# Patient Record
Sex: MALE | Race: WHITE | NOT HISPANIC OR LATINO | Employment: FULL TIME | URBAN - METROPOLITAN AREA
[De-identification: names, ages, dates, MRNs, and addresses within clinical notes are randomized per-mention and may not be internally consistent; named-entity substitution may affect disease eponyms.]

---

## 2017-01-04 ENCOUNTER — INITIAL CONSULT (OUTPATIENT)
Dept: OTOLARYNGOLOGY | Facility: CLINIC | Age: 37
End: 2017-01-04
Payer: COMMERCIAL

## 2017-01-04 ENCOUNTER — CLINICAL SUPPORT (OUTPATIENT)
Dept: AUDIOLOGY | Facility: CLINIC | Age: 37
End: 2017-01-04
Payer: COMMERCIAL

## 2017-01-04 DIAGNOSIS — R42 DIZZINESS: Primary | ICD-10-CM

## 2017-01-04 DIAGNOSIS — H90.3 HEARING LOSS, SENSORINEURAL, ASYMMETRICAL: Primary | ICD-10-CM

## 2017-01-04 PROCEDURE — 1159F MED LIST DOCD IN RCRD: CPT | Mod: S$GLB,,, | Performed by: ORTHOPAEDIC SURGERY

## 2017-01-04 PROCEDURE — 92567 TYMPANOMETRY: CPT | Mod: 51,S$GLB,, | Performed by: AUDIOLOGIST

## 2017-01-04 PROCEDURE — 92557 COMPREHENSIVE HEARING TEST: CPT | Mod: S$GLB,,, | Performed by: AUDIOLOGIST

## 2017-01-04 PROCEDURE — 99204 OFFICE O/P NEW MOD 45 MIN: CPT | Mod: S$GLB,,, | Performed by: ORTHOPAEDIC SURGERY

## 2017-01-04 NOTE — PROGRESS NOTES
"Subjective:       Patient ID: Miko Morton is a 36 y.o. male.    Chief Complaint: No chief complaint on file.    HPI Comments: Patient is a very pleasant 36 year old gentleman here to see me today for the first time for evaluation of dizziness.  He says that he first started with abnormal shooting pain in August, and several months after that began to have issues with dizziness.  He says that the dizziness is a feeling of things "dragging" when he turns his head.  He says that is there nearly all the time, and improves as the day progresses.  He has recently started taking Lexapro, and thinks that may be increasing his symptoms.  He has no complaints of ear pain, ear pressure, or persistent tinnitus.  He has not noted any hearing loss, and has had an audiogram earlier today (his first since he was in school).  He has had an MRI that is consistent with possible MS, but his CSF examination was normal.  He has an upcoming appointment with a MS clinic in [place].  He is also due to see Rheumatology.    Review of Systems   Constitutional: Positive for fatigue. Negative for fever and unexpected weight change.   HENT: Positive for tinnitus (very intermittent). Negative for congestion, ear discharge, ear pain, facial swelling, hearing loss, nosebleeds, postnasal drip, rhinorrhea, sinus pressure, sneezing, sore throat, trouble swallowing and voice change.    Eyes: Negative for discharge, redness and itching.   Respiratory: Negative for cough, choking, shortness of breath and wheezing.    Cardiovascular: Negative for chest pain and palpitations.   Gastrointestinal: Negative for abdominal pain.        No reflux.   Musculoskeletal: Positive for arthralgias, back pain (pressure over his back) and myalgias. Negative for neck pain.   Neurological: Positive for dizziness and light-headedness. Negative for facial asymmetry and headaches.   Hematological: Negative for adenopathy. Does not bruise/bleed easily. "   Psychiatric/Behavioral: Negative for agitation, behavioral problems, confusion and decreased concentration.       Objective:      Physical Exam   Constitutional: He is oriented to person, place, and time. Vital signs are normal. He appears well-developed and well-nourished. No distress.   HENT:   Head: Normocephalic and atraumatic.   Right Ear: Hearing, tympanic membrane, external ear and ear canal normal.   Left Ear: Hearing, tympanic membrane, external ear and ear canal normal.   Nose: Nose normal. No mucosal edema, rhinorrhea, nasal deformity or septal deviation.   Mouth/Throat: Uvula is midline, oropharynx is clear and moist and mucous membranes are normal. No trismus in the jaw. Normal dentition. No uvula swelling. No oropharyngeal exudate or posterior oropharyngeal edema.   Eyes: Conjunctivae and EOM are normal. Pupils are equal, round, and reactive to light. Right eye exhibits no chemosis. Left eye exhibits no chemosis. Right conjunctiva is not injected. Left conjunctiva is not injected. No scleral icterus.   Neck: Trachea normal and phonation normal. No tracheal tenderness present. No tracheal deviation present. No thyroid mass and no thyromegaly present.   Cardiovascular: Intact distal pulses.    Pulmonary/Chest: Effort normal. No accessory muscle usage or stridor. No respiratory distress.   Lymphadenopathy:        Head (right side): No submental, no submandibular, no preauricular and no posterior auricular adenopathy present.        Head (left side): No submental, no submandibular, no preauricular and no posterior auricular adenopathy present.     He has no cervical adenopathy.        Right cervical: No superficial cervical and no deep cervical adenopathy present.       Left cervical: No superficial cervical and no deep cervical adenopathy present.   Neurological: He is alert and oriented to person, place, and time. No cranial nerve deficit.   Skin: Skin is warm and dry. No rash noted. No erythema.    Psychiatric: He has a normal mood and affect. His behavior is normal. Thought content normal.       AUDIOGRAM:  Results reveal a normal   hearing 250-8000 Hz for the right ear, and mild sensorineural hearing loss 7106-9343 Hz for the left ear. Speech Reception Thresholds were 10 dBHL for the right ear and 15 dBHL for the left ear. Word recognition scores were excellent for the right ear and excellent for the left ear. Tympanograms were Type A, normal for the right ear and Type A, normal for the left ear.      MRI BRAIN:  He has had an MRI of the brain with contrast.  Images and report reviewed, there are adequate images of his IAC and there is no abnormality of the IAC to cause his asymmetric hearing loss      Assessment:       1. Dizziness    2. Asymmetrical left sensorineural hearing loss        Plan:       1.  Dizziness:  I had a long discussion with the patient regarding their symptoms.  Dizziness, vertigo and disequilibrium are common symptoms reported by adults during visits to their doctors. They are all symptoms that can result from a peripheral vestibular disorder (a dysfunction of the balance organs of the inner ear) or central vestibular disorder (a dysfunction of one or more parts of the central nervous system that help process balance and spatial information).  There are also non-vestibular causes of dizziness, and dizziness can be linked to a wide array of problems such as blood-flow irregularities from cardiovascular problems and blood pressure fluctuations.  I would recommend a VNG with audiogram for further diagnostic testing, and will contact the patient with further recommendations when that is complete.  2. Asymmetrical left SNHL:  Has had normal MRI of the brain, no further testing is needed at this time.  Annual audiograms, and ear protection around loud noises.  3.  Abnormal MRI:  He is currently in the midst of a neurological workup for an as of yet not fully defined condition.  Has  appointment upcoming in [place] with MS specialist.  He has also been recommended to see Rheumatology, will try and help coordinate rheumatology appointment in the near future.

## 2017-01-04 NOTE — MR AVS SNAPSHOT
[facility] - ENT  [address] [facility] [address] [place] [address]  Phone: [phone]  Fax: [phone]                  Miko Morton   2017 12:45 PM   Initial consult    Description:  Male : 1980   Provider:  Faustina Delgado MD   Department:  [facility] - ENT                To Do List           Future Appointments        Provider Department Dept Phone    2017 11:20 AM Neil Moeller MD [facility] - Internal Medicine [phone]    2017 9:00 AM Juliane Akers, CCC-A [facility] - Audiology [phone]    2017 7:40 AM Foreign Medeiros MD [facility] Neurology [phone]      Goals (5 Years of Data)     None      [facility] On Call     [facility] On Call Nurse [facility] -  Assistance  Registered nurses in the [facility] On Call Center provide clinical advisement, health education, appointment booking, and other advisory services.  Call for this free service at [phone].             Medications           Message regarding Medications     Verify the changes and/or additions to your medication regime listed below are the same as discussed with your clinician today.  If any of these changes or additions are incorrect, please notify your healthcare provider.             Verify that the below list of medications is an accurate representation of the medications you are currently taking.  If none reported, the list may be blank. If incorrect, please contact your healthcare provider. Carry this list with you in case of emergency.           Current Medications     escitalopram oxalate (LEXAPRO) 20 MG tablet Take 1 tablet (20 mg total) by mouth once daily.    hydrOXYzine HCl (ATARAX) 25 MG tablet 1-2 tabs every 8 hours prn anxiety    meloxicam (MOBIC) 15 MG tablet Take 1 tablet (15 mg total) by mouth once daily.    vardenafil (LEVITRA) 20 MG tablet Take 1 tablet (20 mg total) by mouth as needed for Erectile Dysfunction.           Clinical Reference Information           Allergies as of 2017      Sulfa (Sulfonamide Antibiotics)      Immunizations Administered on Date of Encounter - 1/4/2017     None

## 2017-01-04 NOTE — Clinical Note
Is there any way someone in the Rheumatology department could see this nice man sometime in the next few weeks?  He is a , and is having very unusual symptoms (pain, arthralgias, etc.).  He is seeing Neurology, MRI had some abnormal findings but CSF did not support MS.  He is scheduled to see MS specialist at [facility] in [place], but has been recommended to see Rheumatology.  The next available is March, is there any way someone could see him sooner?  Thanks.

## 2017-01-04 NOTE — LETTER
January 5, 2017      Neil Moeller MD  [address] [place] [address]           [facility] - [facility]  [address] [place] [address]  Phone: [phone]  Fax: [phone]          Patient: Miko Morton   MR Number: [id number]   YOB: 1980   Date of Visit: 1/4/2017       Dear Dr. Neil Moleler:    Thank you for referring Miko Morton to me for evaluation. Attached you will find relevant portions of my assessment and plan of care.    If you have questions, please do not hesitate to call me. I look forward to following Miko Morton along with you.    Sincerely,    Faustina Delgado MD    Enclosure  CC:  No Recipients    If you would like to receive this communication electronically, please contact [email or web address] or [phone] to request more information on [facility] access.    For providers and/or their staff who would like to refer a patient to [facility], please contact us through our one-stop-shop provider referral line, [facility] , at [phone].    If you feel you have received this communication in error or would no longer like to receive these types of communications, please e-mail [email or web address]

## 2017-01-04 NOTE — PROGRESS NOTES
Miko Morton was seen 01/04/2017 for an audiological evaluation.  Patient complains of daily dizziness for the past 4-5 months.  He reports that it gradually improves throughout the day.  He also reports if he looks back and forth, like while shopping, the room will spin.  He has experienced occasional tinnitus.  He has no complaint of hearing loss or changes in his hearing since onset of symptoms.  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    Results reveal a normal   hearing 250-8000 Hz for the right ear, and  mild sensorineural hearing loss 7082-1699 Hz for the left ear.   Speech Reception Thresholds were  10 dBHL for the right ear and 15 dBHL for the left ear.   Word recognition scores were excellent for the right ear and excellent for the left ear.   Tympanograms were Type A, normal for the right ear and Type A, normal for the left ear.    Patient was counseled on the above findings.    Recommendations include:    1.  ENT follow-up re:asymmetry  2.  Recheck per ENT, HAs were discussed briefly (he perceives normal hearing)  3.  Wear hearing protective devices around loud noise  4.  Annual audiograms

## 2017-01-06 ENCOUNTER — TELEPHONE (OUTPATIENT)
Dept: OTOLARYNGOLOGY | Facility: CLINIC | Age: 37
End: 2017-01-06

## 2017-01-06 ENCOUNTER — TELEPHONE (OUTPATIENT)
Dept: RHEUMATOLOGY | Facility: CLINIC | Age: 37
End: 2017-01-06

## 2017-01-06 NOTE — TELEPHONE ENCOUNTER
----- Message from Prashant Owens MD sent at 1/6/2017 10:19 AM CST -----  Sure Faustina, We will be able to see him in the next couple of weeks . I will request our team to have in acute waiting list to be called in once any cancellation opens up with any of our providers, which is very common at this time of the year.   Regards  Gloria.   ----- Message -----     From: Faustina Delgado MD     Sent: 1/5/2017  11:22 AM       To: Prashant Owens MD    Is there any way someone in the Rheumatology department could see this nice man sometime in the next few weeks?  He is a , and is having very unusual symptoms (pain, arthralgias, etc.).  He is seeing Neurology, MRI had some abnormal findings but CSF did not support MS.  He is scheduled to see MS specialist at [facility] in [place], but has been recommended to see Rheumatology.  The next available is March, is there any way someone could see him sooner?  Thanks.

## 2017-01-06 NOTE — TELEPHONE ENCOUNTER
Spoke with pt and offered an apt for Monday, 1/9/17 at 3:00 pm, pt declined apt. Advised him we will call back if we get any other cancellations. Pt verbalized understanding.

## 2017-01-06 NOTE — TELEPHONE ENCOUNTER
----- Message from Faustina Delgado MD sent at 1/6/2017 10:50 AM CST -----  Please call the patient and let him know that Rheumatology is working to get him seen.    ----- Message -----     From: Prashant Owens MD     Sent: 1/6/2017  10:19 AM       To: Faustina Delgado MD, Kayli Cortez MA, #    Sure Faustina, We will be able to see him in the next couple of weeks . I will request our team to have in acute waiting list to be called in once any cancellation opens up with any of our providers, which is very common at this time of the year.   Regards  Gloria.   ----- Message -----     From: Faustina Delgado MD     Sent: 1/5/2017  11:22 AM       To: Prashant Owens MD    Is there any way someone in the Rheumatology department could see this nice man sometime in the next few weeks?  He is a , and is having very unusual symptoms (pain, arthralgias, etc.).  He is seeing Neurology, MRI had some abnormal findings but CSF did not support MS.  He is scheduled to see MS specialist at [facility] in [place], but has been recommended to see Rheumatology.  The next available is March, is there any way someone could see him sooner?  Thanks.

## 2017-01-11 ENCOUNTER — OFFICE VISIT (OUTPATIENT)
Dept: INTERNAL MEDICINE | Facility: CLINIC | Age: 37
End: 2017-01-11
Payer: COMMERCIAL

## 2017-01-11 VITALS
TEMPERATURE: 97 F | HEIGHT: 77 IN | HEART RATE: 74 BPM | BODY MASS INDEX: 31.63 KG/M2 | WEIGHT: 267.88 LBS | OXYGEN SATURATION: 97 % | SYSTOLIC BLOOD PRESSURE: 126 MMHG | DIASTOLIC BLOOD PRESSURE: 86 MMHG

## 2017-01-11 DIAGNOSIS — F32.A DEPRESSION, UNSPECIFIED DEPRESSION TYPE: ICD-10-CM

## 2017-01-11 DIAGNOSIS — M79.18 MUSCULOSKELETAL PAIN: Primary | ICD-10-CM

## 2017-01-11 PROBLEM — F41.9 ANXIETY: Status: ACTIVE | Noted: 2017-01-11

## 2017-01-11 PROCEDURE — 1159F MED LIST DOCD IN RCRD: CPT | Mod: S$GLB,,, | Performed by: INTERNAL MEDICINE

## 2017-01-11 PROCEDURE — 99214 OFFICE O/P EST MOD 30 MIN: CPT | Mod: S$GLB,,, | Performed by: INTERNAL MEDICINE

## 2017-01-11 PROCEDURE — 99999 PR PBB SHADOW E&M-EST. PATIENT-LVL III: CPT | Mod: PBBFAC,,, | Performed by: INTERNAL MEDICINE

## 2017-01-11 RX ORDER — ESCITALOPRAM OXALATE 10 MG/1
10 TABLET ORAL DAILY
Qty: 90 TABLET | Refills: 3 | Status: SHIPPED | OUTPATIENT
Start: 2017-01-11 | End: 2017-03-08

## 2017-01-11 RX ORDER — CYCLOBENZAPRINE HCL 10 MG
10 TABLET ORAL 3 TIMES DAILY PRN
Qty: 30 TABLET | Refills: 0 | Status: SHIPPED | OUTPATIENT
Start: 2017-01-11 | End: 2017-01-21

## 2017-01-11 RX ORDER — ESCITALOPRAM OXALATE 20 MG/1
10 TABLET ORAL DAILY
Qty: 90 TABLET | Refills: 3 | Status: SHIPPED | OUTPATIENT
Start: 2017-01-11 | End: 2017-01-11 | Stop reason: SDUPTHER

## 2017-01-11 NOTE — MR AVS SNAPSHOT
[facility] Internal Medicine  [address]  [place] [address]  Phone: [phone]  Fax: [phone]                  Miko Morton   2017 11:20 AM   Office Visit    Description:  Male : 1980   Provider:  Neil Moeller MD   Department:  [facility] - Internal Medicine           Reason for Visit     Follow-up           Diagnoses this Visit        Comments    Musculoskeletal pain    -  Primary     Depression, unspecified depression type                To Do List           Future Appointments        Provider Department Dept Phone    2017 9:00 AM Juliane Akers, CCC-A [facility] - Audiology [phone]    2017 7:40 AM Foreign Medeiros MD [facility] Neurology [phone]    2017 8:00 AM Sheri Valenzuela MD [facility] Rheumatology [phone]      Goals (5 Years of Data)     None      Follow-Up and Disposition     Return in about 6 months (around 2017), or if symptoms worsen or fail to improve.       These Medications        Disp Refills Start End    cyclobenzaprine (FLEXERIL) 10 MG tablet 30 tablet 0 2017    Take 1 tablet (10 mg total) by mouth 3 (three) times daily as needed for Muscle spasms. Prn muscle spasm - Oral    Pharmacy: [facility] [address] [place] [address] AT [facility] Ph #: [phone]       escitalopram oxalate (LEXAPRO) 10 MG tablet 90 tablet 3 2017    Take 1 tablet (10 mg total) by mouth once daily. - Oral    Pharmacy: [facility] [address] [place] [address], [place] - [address] AT [facility] Ph #: [phone]       Notes to Pharmacy: To replace 20 mg script sent in error.      [facility] On Call     [facility] On Call Nurse Care Line -  Assistance  Registered nurses in the [facility] On Call Center provide clinical advisement, health education, appointment booking, and other advisory services.  Call for this free service at [phone].             Medications          "  Message regarding Medications     Verify the changes and/or additions to your medication regime listed below are the same as discussed with your clinician today.  If any of these changes or additions are incorrect, please notify your healthcare provider.        START taking these NEW medications        Refills    cyclobenzaprine (FLEXERIL) 10 MG tablet 0    Sig: Take 1 tablet (10 mg total) by mouth 3 (three) times daily as needed for Muscle spasms. Prn muscle spasm    Class: Normal    Route: Oral      CHANGE how you are taking these medications     Start Taking Instead of    escitalopram oxalate (LEXAPRO) 10 MG tablet escitalopram oxalate (LEXAPRO) 20 MG tablet    Dosage:  Take 1 tablet (10 mg total) by mouth once daily. Dosage:  Take 1 tablet (20 mg total) by mouth once daily.    Reason for Change:  Reorder            Verify that the below list of medications is an accurate representation of the medications you are currently taking.  If none reported, the list may be blank. If incorrect, please contact your healthcare provider. Carry this list with you in case of emergency.           Current Medications     escitalopram oxalate (LEXAPRO) 10 MG tablet Take 1 tablet (10 mg total) by mouth once daily.    hydrOXYzine HCl (ATARAX) 25 MG tablet 1-2 tabs every 8 hours prn anxiety    vardenafil (LEVITRA) 20 MG tablet Take 1 tablet (20 mg total) by mouth as needed for Erectile Dysfunction.    cyclobenzaprine (FLEXERIL) 10 MG tablet Take 1 tablet (10 mg total) by mouth 3 (three) times daily as needed for Muscle spasms. Prn muscle spasm    meloxicam (MOBIC) 15 MG tablet Take 1 tablet (15 mg total) by mouth once daily.           Clinical Reference Information           Vital Signs - Last Recorded  Most recent update: 1/11/2017 11:17 AM by Judy Lee MA    BP Pulse Temp Ht Wt SpO2    126/86 (BP Location: Right arm, Patient Position: Sitting) 74 97.2 °F (36.2 °C) (Tympanic) 6' 5" (1.956 m) 121.5 kg (267 lb 13.7 oz) " 97%    BMI                31.76 kg/m2          Blood Pressure          Most Recent Value    BP  126/86      Allergies as of 1/11/2017     Sulfa (Sulfonamide Antibiotics)      Immunizations Administered on Date of Encounter - 1/11/2017     None

## 2017-01-11 NOTE — PROGRESS NOTES
"Subjective:      Patient ID: Miko Morton is a 36 y.o. male.    Chief Complaint: Follow-up    HPI Comments: 37 yo with Patient Active Problem List:     Anxiety    Here today for management of anxiety. Also c/o left back pain. Dizziness, blurred vision, arthralgias, HA have improved but not resolved. Taking Elham at casual advise of a neurologist acquaintance in [place]. 20mg lexapro caused drowsiness. Back on 10mg  and feels 10mg is helping depression and anxiety.   Back Pain   This is a new problem. The current episode started 1 to 4 weeks ago. The problem occurs intermittently. The problem is unchanged. Pain location: left thoracic area. The quality of the pain is described as aching. The pain does not radiate. The pain is moderate. The pain is the same all the time. Exacerbated by: reaching across body. Pertinent negatives include no abdominal pain, chest pain, dysuria or fever. He has tried NSAIDs for the symptoms. The treatment provided moderate relief.     Review of Systems   Constitutional: Negative for chills and fever.   HENT: Negative for ear pain and sore throat.    Respiratory: Negative for cough.    Cardiovascular: Negative for chest pain.   Gastrointestinal: Negative for abdominal pain and blood in stool.   Genitourinary: Negative for dysuria and hematuria.   Musculoskeletal: Positive for back pain. Negative for neck pain and neck stiffness.   Neurological: Negative for seizures and syncope.     Objective:     Visit Vitals    /86 (BP Location: Right arm, Patient Position: Sitting)    Pulse 74    Temp 97.2 °F (36.2 °C) (Tympanic)    Ht 6' 5" (1.956 m)    Wt 121.5 kg (267 lb 13.7 oz)    SpO2 97%    BMI 31.76 kg/m2       Physical Exam   Constitutional: He is oriented to person, place, and time. He appears well-developed and well-nourished. No distress.   HENT:   Head: Normocephalic and atraumatic.   Mouth/Throat: Oropharynx is clear and moist.   Eyes: EOM are normal. Pupils are " equal, round, and reactive to light.   Neck: Neck supple. No thyromegaly present.   Cardiovascular: Normal rate and regular rhythm.    Pulmonary/Chest: Breath sounds normal. He has no wheezes. He has no rales.   Abdominal: Soft. Bowel sounds are normal. There is no tenderness.   Musculoskeletal: He exhibits no edema.        Left shoulder: He exhibits normal range of motion, no tenderness, no bony tenderness, no swelling and no crepitus.        Lumbar back: He exhibits normal range of motion, no tenderness and no bony tenderness.        Back:    Lymphadenopathy:     He has no cervical adenopathy.   Neurological: He is alert and oriented to person, place, and time.   Skin: Skin is warm and dry.   Psychiatric: He has a normal mood and affect. His behavior is normal.       Assessment:     1. Musculoskeletal pain    2. Depression, unspecified depression type      Plan:   Musculoskeletal pain  -     cyclobenzaprine (FLEXERIL) 10 MG tablet; Take 1 tablet (10 mg total) by mouth 3 (three) times daily as needed for Muscle spasms. Prn muscle spasm  Dispense: 30 tablet; Refill: 0  Try taking your meloxicam dialy for one to two weeks  Physiatry if no improvement    Depression, unspecified depression type  Stable improving  refilled  -     escitalopram oxalate (LEXAPRO) 10 MG tablet; Take 1 tablet (10 mg total) by mouth once daily.  Dispense: 90 tablet; Refill: 3        Lab Frequency Next Occurrence   5 HIAA, quantitative, Urine Once 12/15/2016         Return in about 6 months (around 7/11/2017), or if symptoms worsen or fail to improve.

## 2017-01-12 ENCOUNTER — CLINICAL SUPPORT (OUTPATIENT)
Dept: AUDIOLOGY | Facility: CLINIC | Age: 37
End: 2017-01-12
Payer: COMMERCIAL

## 2017-01-12 DIAGNOSIS — H83.2X9 OTHER FORMS AND COMBINATIONS OF LABYRINTHINE DYSFUNCTION: Primary | ICD-10-CM

## 2017-01-12 PROCEDURE — 92540 BASIC VESTIBULAR EVALUATION: CPT | Mod: S$GLB,,, | Performed by: AUDIOLOGIST-HEARING AID FITTER

## 2017-01-12 PROCEDURE — 92537 CALORIC VSTBLR TEST W/REC: CPT | Mod: 51,S$GLB,, | Performed by: AUDIOLOGIST-HEARING AID FITTER

## 2017-01-12 NOTE — PROGRESS NOTES
Referring provider: Dr. Danny Crouch Saulo Morton was seen 2017 for Videonystagmography (VNG) testing.    Patient complains of dizziness, described as lighthededness.  It was at its worst when shopping - when stopped at a shelf looking around rapidly for items he felt lightheaded and general dizziness.  No significant spinning described.  This began around 2015 and the dizziness is constant and fluctuates.  Patient denied associated hearing changes and reported equal-sided hearing.  His recent audiogram indicated a left ear HF-SNHL asymmetry.  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      Oculomotor function tests (sinusoidal tracking, saccade and OPKs) were normal and symmetric.  Spontaneous nystagmus was absent.  Gaze nystagmus was absent.  Head-shake test was absent for after head shake nystagmus.  Yessica-Hallpike Left was negative for BPPV.  Yessica-Hallpike Right was negative for BPPV.  Static Positional nystagmus was absent.  Bi-thermal caloric irrigations revealed a 14% caloric weakness in the left ear, which is within normal limits, and 8% directional preponderance to the left, which is within normal limits.  Fixation suppression following caloric irrigations were normal.  RC:  13 d/s   RW:  14%    d/s   LW:  7 d/s    Impressions: Normal VNG    Rec:  1. ENT review  2. A trial with VOR+VRT exercises.  Handouts were given and reviewed with the patient.  3. Annual audiograms to monitor hearing due to asymmetry in hearing.  I reviewed audiogram results with patient and answered questions.  I discussed MRI indicated normal IAC.   4. Hearing protection around loud noises    Tracings are scanned into computer.

## 2017-01-13 ENCOUNTER — TELEPHONE (OUTPATIENT)
Dept: OTOLARYNGOLOGY | Facility: HOSPITAL | Age: 37
End: 2017-01-13

## 2017-01-13 NOTE — TELEPHONE ENCOUNTER
----- Message from Juliane Akers, CCC-A sent at 1/12/2017  9:58 AM CST -----  VNG review - please contact patient with your plan.

## 2017-01-13 NOTE — TELEPHONE ENCOUNTER
Please let Mr. Morton know that I have reviewed his testing yesterday and it shows that the function of the inner ear is normal, and is not the cause for his dizziness.  Continue with current plan (neurology, rheumatology eval).

## 2017-01-26 ENCOUNTER — OFFICE VISIT (OUTPATIENT)
Dept: NEUROLOGY | Facility: CLINIC | Age: 37
End: 2017-01-26
Payer: COMMERCIAL

## 2017-01-26 VITALS
HEART RATE: 88 BPM | WEIGHT: 247.81 LBS | SYSTOLIC BLOOD PRESSURE: 146 MMHG | HEIGHT: 77 IN | DIASTOLIC BLOOD PRESSURE: 99 MMHG | BODY MASS INDEX: 29.26 KG/M2

## 2017-01-26 DIAGNOSIS — R42 DIZZINESS: Primary | ICD-10-CM

## 2017-01-26 PROCEDURE — 99213 OFFICE O/P EST LOW 20 MIN: CPT | Mod: S$GLB,,, | Performed by: PSYCHIATRY & NEUROLOGY

## 2017-01-26 PROCEDURE — 1159F MED LIST DOCD IN RCRD: CPT | Mod: S$GLB,,, | Performed by: PSYCHIATRY & NEUROLOGY

## 2017-01-26 PROCEDURE — 99999 PR PBB SHADOW E&M-EST. PATIENT-LVL III: CPT | Mod: PBBFAC,,, | Performed by: PSYCHIATRY & NEUROLOGY

## 2017-01-26 NOTE — MR AVS SNAPSHOT
[facility]  [address]  [facility] [address]  Phone: [phone]  Fax: [phone]                  Miko Morton   2017 7:40 AM   Office Visit    Description:  Male : 1980   Provider:  Foreign Medeiros MD   Department:  [facility]           Reason for Visit     Consult           Diagnoses this Visit        Comments    Dizziness    -  Primary            To Do List           Future Appointments        Provider Department Dept Phone    2017 8:00 AM Sheri Valenzuela MD [facility] Rheumatology [phone]      Goals (5 Years of Data)     None      [facility] On Call     [facility] On Call Nurse Care Line -  Assistance  Registered nurses in the [facility] On Call Center provide clinical advisement, health education, appointment booking, and other advisory services.  Call for this free service at [phone].             Medications           Message regarding Medications     Verify the changes and/or additions to your medication regime listed below are the same as discussed with your clinician today.  If any of these changes or additions are incorrect, please notify your healthcare provider.             Verify that the below list of medications is an accurate representation of the medications you are currently taking.  If none reported, the list may be blank. If incorrect, please contact your healthcare provider. Carry this list with you in case of emergency.           Current Medications     escitalopram oxalate (LEXAPRO) 10 MG tablet Take 1 tablet (10 mg total) by mouth once daily.    hydrOXYzine HCl (ATARAX) 25 MG tablet 1-2 tabs every 8 hours prn anxiety    meloxicam (MOBIC) 15 MG tablet Take 1 tablet (15 mg total) by mouth once daily.    vardenafil (LEVITRA) 20 MG tablet Take 1 tablet (20 mg total) by mouth as needed for Erectile Dysfunction.           Clinical Reference Information           Vital Signs - Last Recorded  Most recent update:  "1/26/2017  7:50 AM by Maria T Walters MA    BP Pulse Ht Wt BMI    (!) 146/99 88 6' 5" (1.956 m) 112.4 kg (247 lb 12.8 oz) 29.38 kg/m2      Blood Pressure          Most Recent Value    BP  (!)  146/99      Allergies as of 1/26/2017     Sulfa (Sulfonamide Antibiotics)      Immunizations Administered on Date of Encounter - 1/26/2017     None      "

## 2017-01-26 NOTE — LETTER
January 26, 2017      Charles Shah MD  [address] [place] [address]           [facility]  [address]  [place] [address]  Phone: [phone]  Fax: [phone]          Patient: iMko Morton   MR Number: [id number]   YOB: 1980   Date of Visit: 1/26/2017       Dear Dr. Charles Shah:    Thank you for referring Miko Morton to me for evaluation. Attached you will find relevant portions of my assessment and plan of care.    If you have questions, please do not hesitate to call me. I look forward to following Miko Morton along with you.    Sincerely,    Foreign Medeiros MD    Enclosure  CC:  No Recipients    If you would like to receive this communication electronically, please contact [email or web address] or [phone] to request more information on [employer] Link access.    For providers and/or their staff who would like to refer a patient to [facility], please contact us through our one-stop-shop provider referral line, [facility], at [phone].    If you feel you have received this communication in error or would no longer like to receive these types of communications, please e-mail [email or web address]
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
## 2017-02-06 ENCOUNTER — PATIENT MESSAGE (OUTPATIENT)
Dept: INTERNAL MEDICINE | Facility: CLINIC | Age: 37
End: 2017-02-06

## 2017-02-14 RX ORDER — DOXYCYCLINE HYCLATE 100 MG
100 TABLET ORAL EVERY 12 HOURS
Qty: 28 TABLET | Refills: 0 | Status: SHIPPED | OUTPATIENT
Start: 2017-02-14 | End: 2017-02-28

## 2017-02-20 ENCOUNTER — LAB VISIT (OUTPATIENT)
Dept: LAB | Facility: HOSPITAL | Age: 37
End: 2017-02-20
Attending: INTERNAL MEDICINE
Payer: COMMERCIAL

## 2017-02-20 ENCOUNTER — OFFICE VISIT (OUTPATIENT)
Dept: RHEUMATOLOGY | Facility: CLINIC | Age: 37
End: 2017-02-20
Payer: COMMERCIAL

## 2017-02-20 VITALS
DIASTOLIC BLOOD PRESSURE: 90 MMHG | BODY MASS INDEX: 29.55 KG/M2 | SYSTOLIC BLOOD PRESSURE: 150 MMHG | WEIGHT: 250.25 LBS | HEIGHT: 77 IN | HEART RATE: 90 BPM

## 2017-02-20 DIAGNOSIS — R42 DIZZINESS: Primary | ICD-10-CM

## 2017-02-20 DIAGNOSIS — G93.9 BRAIN LESION: ICD-10-CM

## 2017-02-20 DIAGNOSIS — R42 DIZZINESS: ICD-10-CM

## 2017-02-20 DIAGNOSIS — F41.9 ANXIETY: ICD-10-CM

## 2017-02-20 LAB
C3 SERPL-MCNC: 104 MG/DL
C4 SERPL-MCNC: 16 MG/DL
CCP AB SER IA-ACNC: <0.5 U/ML
CRP SERPL-MCNC: 0.8 MG/L
ERYTHROCYTE [SEDIMENTATION RATE] IN BLOOD BY WESTERGREN METHOD: 2 MM/HR
MICROSCOPIC COMMENT: NORMAL
SQUAMOUS #/AREA URNS HPF: 1 /HPF
WBC #/AREA URNS HPF: 2 /HPF (ref 0–5)

## 2017-02-20 PROCEDURE — 86140 C-REACTIVE PROTEIN: CPT

## 2017-02-20 PROCEDURE — 85651 RBC SED RATE NONAUTOMATED: CPT | Mod: PO

## 2017-02-20 PROCEDURE — 86255 FLUORESCENT ANTIBODY SCREEN: CPT

## 2017-02-20 PROCEDURE — 85613 RUSSELL VIPER VENOM DILUTED: CPT

## 2017-02-20 PROCEDURE — 99204 OFFICE O/P NEW MOD 45 MIN: CPT | Mod: S$GLB,,, | Performed by: INTERNAL MEDICINE

## 2017-02-20 PROCEDURE — 86235 NUCLEAR ANTIGEN ANTIBODY: CPT | Mod: 59

## 2017-02-20 PROCEDURE — 99999 PR PBB SHADOW E&M-EST. PATIENT-LVL III: CPT | Mod: PBBFAC,,, | Performed by: INTERNAL MEDICINE

## 2017-02-20 PROCEDURE — 82787 IGG 1 2 3 OR 4 EACH: CPT | Mod: 59

## 2017-02-20 PROCEDURE — 86146 BETA-2 GLYCOPROTEIN ANTIBODY: CPT | Mod: 59

## 2017-02-20 PROCEDURE — 81000 URINALYSIS NONAUTO W/SCOPE: CPT | Mod: PO

## 2017-02-20 PROCEDURE — 86160 COMPLEMENT ANTIGEN: CPT

## 2017-02-20 PROCEDURE — 86147 CARDIOLIPIN ANTIBODY EA IG: CPT | Mod: 59

## 2017-02-20 PROCEDURE — 86235 NUCLEAR ANTIGEN ANTIBODY: CPT

## 2017-02-20 PROCEDURE — 83516 IMMUNOASSAY NONANTIBODY: CPT | Mod: 59

## 2017-02-20 PROCEDURE — 83516 IMMUNOASSAY NONANTIBODY: CPT

## 2017-02-20 PROCEDURE — 86160 COMPLEMENT ANTIGEN: CPT | Mod: 59

## 2017-02-20 PROCEDURE — 36415 COLL VENOUS BLD VENIPUNCTURE: CPT | Mod: PO

## 2017-02-20 PROCEDURE — 86200 CCP ANTIBODY: CPT

## 2017-02-20 PROCEDURE — 82164 ANGIOTENSIN I ENZYME TEST: CPT

## 2017-02-20 NOTE — PROGRESS NOTES
"Subjective:       Patient ID: Miko Morton is a 36 y.o. male.    Chief Complaint: myalgia    HPI   Mr. Miko Welsh is referred to me by PCP for evaluation of arthralgias in th setting of discovered "foci of signal hypertintensity in the frontoparietal subcortical white matter bilaterally."   In august, he was in his usual state of health (polic officer, active) developed shooting pains all over the body, hands, toes, legs. This developed slowly and then he developed dizziness vertigo pronounced when he was looking around. No weakness or falls.  The dizziness was all the day and he was able to work with it but felt like he shouldn't be. He says this dizziness has never went away but has improved since getting his teeth fixed. He describes is at a very subtle dragging of picture when he turns his head.   He went to ENT and was found to have hearing problem left ear (incidentally diagnosed)  and nystagmus is within normal range.   Pain level is a 2 right now, in the fingers, toes. Throbbing. No numbness or tingling.     No concussions in the past or brain injury.   Shoots at the range but not too much.     He is going through dental procedures right now. He had 4 root canals in 10/2016 and had a root canal last week. He notes after this mild improvement in the dizziness, shooting pains, a little blurred vision, headaches.    Occasional joint pain in the toes, no joint swelling.    Chest pain- went to ED twice and work-up was negative.  Sometimes it is localized on both sides and it comes on randomly no clear association with exercise.     He went to [place] to see MS specialist and they want to repeat imaging in 6 months.     6-7 hours of sleep per night. 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       He has been taking ibuprofen three to four at a time for headaches a couple times a week and this is chronic use.      Review of Systems   Constitutional: Negative for chills and fever. " "  HENT:        No dry eyes or dry mouth    No oral nasal ulcers     Eyes: Positive for pain (last week had eye pain ). Negative for redness.        Wears contacts     Respiratory: Negative for cough and shortness of breath.         No dysphagia     Cardiovascular: Positive for chest pain. Negative for leg swelling.   Gastrointestinal: Negative.    Endocrine:        No   allopecia     Genitourinary: Negative for genital sores.   Musculoskeletal: Positive for arthralgias (toes, fingers). Negative for joint swelling and neck pain.   Skin: Negative for rash.   Neurological: Positive for headaches (left side of the head).       Left shoulder blade pain- feels like a pulled muscle (flexeril)   Arm numbness that comes and goes in the Right arm   headaches    Fmhx:  Unknown hx for rheumatologic  No early hx of CVA or CAD    Social Hx:  No children  nonsmoker  No longer drinking. Cut back  Since all of this started was drinking a couple beverages 3-5 times/week.     Objective:     Visit Vitals    BP (!) 150/90    Pulse 90    Ht 6' 5" (1.956 m)    Wt 113.5 kg (250 lb 3.6 oz)    BMI 29.67 kg/m2        Physical Exam   Vitals reviewed.  Constitutional: He is oriented to person, place, and time and well-developed, well-nourished, and in no distress. No distress.   HENT:   Head: Normocephalic and atraumatic.   Right Ear: External ear normal.   Left Ear: External ear normal.   Eyes: Conjunctivae are normal. Right eye exhibits no discharge. Left eye exhibits no discharge. No scleral icterus.   Neck: Normal range of motion. Neck supple.   Poor posture     Cardiovascular: Normal rate, regular rhythm and normal heart sounds.    Pulmonary/Chest: Effort normal and breath sounds normal. No respiratory distress.   Tender paraspinals left side thoracic region   Abdominal: He exhibits no distension.   Neurological: He is alert and oriented to person, place, and time. No cranial nerve deficit. He exhibits normal muscle tone.   Strength " 5/5 in UE and LE proximal and distal muscles     Skin: Skin is warm and dry. No rash noted. He is not diaphoretic. No erythema.     Psychiatric: Mood, memory, affect and judgment normal.   Musculoskeletal: Normal range of motion. He exhibits deformity (hallux valgus b/l with tenderness over MTP). He exhibits no edema.           LABS REVIEWED  Cpk, aldolase normal  Normal sed rate, crp  Neg george  Neg rf  Neg RPR  HIV  Lyme Ab  Neg Myelin Basic Protein     Urine 2013 with 9 WBC    Normal CBC   and CMP 12/2016    Normal tSH    CSF   2 RBC  3 WBC  Normal protein   Negative MS profile  Normal protein  Normal glucose    IMAGING REVIEWED  Normal VNG videonystagmography    cxr 12/2016 normal   cxr 8/2016 without nodules    10/2016 MRI brain with white matter signal alteration compatible with reported history of MS, no evidence of active demyelination    MRI cervical spine  DJD < DDD  No abnormal lesions              Assessment:       1. Dizziness    2. Brain lesion    3. Anxiety        Dizziness- improving after root canals, work-up includes ENT who reviewed MRI and show his IAC are normal and they recommend annual audiograms. Neurology notes reviewed and they cannot explain all of these symptoms but were suspicious for MS but CSF findings were unremarkable- they plan to repeat MRI in 6 months.     Brain lesions- nothing obvious on history or physical that would lead me to suspect inflammatory disease. His GEORGE is negative, CPK, inflammatory markers and normal CSF findings speak against any CNS involvement. He has no other organ involvement it seems like and his sensory changes come and go. His joint pains are in the feet and hands and he has no evidence of inflammatory arthritis rather osteoarthritis in the feet and hallux valgus. His symptoms are improving after he had dental work done (had multiple root canals)      Plan:     Work on posture to help improve thoracic back pain  Check ANCA, PR3, MPO, urinalysis, pc  ratio  Ace level   APS serologies  IGG 1,2,3,4  Repeat sed rate, CRP  c3  c4  CCP  SSA  SSB    Neurology can consider EMG to evaluate for perpiheral neuropathy    Discussed anti inflammatory diet  Aerobic exercise    Continue to follow-up with PCP for anxiety management   Continue to follow-up with neurology   No need to follow-up in clinic unless new symptoms arise that are suspicious for autoimmune etiology.   HE will keep me updated through [facility] portal.     MB

## 2017-02-20 NOTE — MR AVS SNAPSHOT
[facility] Rheumatology  [address] [facility] [place] [address] [place] [address]  Phone: [phone]  Fax: [phone]                  Miko Morton   2017 8:00 AM   Office Visit    Description:  Male : 1980   Provider:  Sheri Valenzuela MD   Department[facility]  [facility] - Rheumatology           Reason for Visit     myalgia           Diagnoses this Visit        Comments    Dizziness    -  Primary     Brain lesion                To Do List           Future Appointments        Provider Department Dept Phone    2017 9:25 AM LABORATORY, [facility] [phone]      Goals (5 Years of Data)     None      [facility] On Call     [facility] On Call Nurse Care Line -  Assistance  Registered nurses in the [facility] On Call Center provide clinical advisement, health education, appointment booking, and other advisory services.  Call for this free service at [phone].             Medications           Message regarding Medications     Verify the changes and/or additions to your medication regime listed below are the same as discussed with your clinician today.  If any of these changes or additions are incorrect, please notify your healthcare provider.             Verify that the below list of medications is an accurate representation of the medications you are currently taking.  If none reported, the list may be blank. If incorrect, please contact your healthcare provider. Carry this list with you in case of emergency.           Current Medications     escitalopram oxalate (LEXAPRO) 10 MG tablet Take 1 tablet (10 mg total) by mouth once daily.    doxycycline (VIBRA-TABS) 100 MG tablet Take 1 tablet (100 mg total) by mouth every 12 (twelve) hours.    hydrOXYzine HCl (ATARAX) 25 MG tablet 1-2 tabs every 8 hours prn anxiety    meloxicam (MOBIC) 15 MG tablet Take 1 tablet (15 mg total) by mouth once daily.    vardenafil (LEVITRA) 20 MG tablet Take 1 tablet (20 mg total) by mouth as needed  "for Erectile Dysfunction.           Clinical Reference Information           Your Vitals Were     BP Pulse Height Weight BMI    150/90 90 6' 5" (1.956 m) 113.5 kg (250 lb 3.6 oz) 29.67 kg/m2      Blood Pressure          Most Recent Value    BP  (!)  150/90      Allergies as of 2/20/2017     Sulfa (Sulfonamide Antibiotics)      Immunizations Administered on Date of Encounter - 2/20/2017     None      Orders Placed During Today's Visit      Normal Orders This Visit    Protein / creatinine ratio, urine     Future Labs/Procedures Expected by Expires    ANGIOTENSIN CONVERTING ENZYME  2/20/2017 4/21/2018    ANTI-NEUTROPHILIC CYTOPLASMIC ANTIBODY  2/20/2017 4/21/2018    Beta-2 glycoprotein antibodies  2/20/2017 4/21/2018    Cardiolipin antibody  2/20/2017 4/21/2018    Cyclic citrul peptide antibody, IgG  2/20/2017 4/21/2018    DRVVT  2/20/2017 2/20/2018    IgG 1, 2, 3, and 4  2/20/2017 4/21/2018    Myeloperoxidase Antibody (MPO)  2/20/2017 4/21/2018    Proteinase 3 Autoantibodies  2/20/2017 4/21/2018    Sedimentation rate, manual  2/20/2017 2/20/2018    Sjogrens syndrome-A extractable nuclear antibody  2/20/2017 4/21/2018    Sjogrens syndrome-B extractable nuclear antibody  2/20/2017 4/21/2018    Recurring Lab Work Interval Expires    C-reactive protein  Every 12 Weeks until 4/21/2018 4/21/2018    C3 complement   4/21/2018    C4 complement   4/21/2018    Urinalysis Microscopic   2/21/2019      Language Assistance Services     ATTENTION: Language assistance services are available, free of charge. Please call [phone].      ATENCIÓN: Si habla pavelañol, tiene a tavarez disposición servicios gratuitos de asistencia lingüística. Llame al [phone].     CHÚ Ý: N?u b?n nói Ti?ng Vi?t, có các d?ch v? h? tr? ngôn ng? mi?n phí dành cho b?n. G?i s? [phone].         [facility] - Rheumatology complies with applicable Federal civil rights laws and does not discriminate on the basis of race, color, national origin, age, " disability, or sex.

## 2017-02-20 NOTE — LETTER
February 20, 2017      Faustina Delgado MD  [address] [place] [address] [address] [place] [address]           [facility] - Rheumatology  [address] [facility] [address] [place] [address]  Phone: [phone]  Fax: [phone]          Patient: Miko Morton   MR Number: [id number]   YOB: 1980   Date of Visit: 2/20/2017       Dear Dr. Faustina Delgado:    Thank you for referring Miko Morton to me for evaluation. Attached you will find relevant portions of my assessment and plan of care.    If you have questions, please do not hesitate to call me. I look forward to following Miko Morton along with you.    Sincerely,    Sheri Valenzuela MD    Enclosure  CC:  No Recipients    If you would like to receive this communication electronically, please contact [email or web address] or [phone] to request more information on [employer] Link access.    For providers and/or their staff who would like to refer a patient to [facility], please contact us through our one-stop-shop provider referral line, [facility], at [phone].    If you feel you have received this communication in error or would no longer like to receive these types of communications, please e-mail [email or web address]

## 2017-02-21 LAB
ACE SERPL-CCNC: 41 U/L
ANTI-SSA ANTIBODY: 0.77 EU
ANTI-SSA INTERPRETATION: NEGATIVE
ANTI-SSB ANTIBODY: 0.41 EU
ANTI-SSB INTERPRETATION: NEGATIVE
CARDIOLIPIN IGG SER IA-ACNC: <9.4 GPL
CARDIOLIPIN IGM SER IA-ACNC: <9.4 MPL
LA PPP-IMP: NEGATIVE
PROTEINASE3 IGG SER-ACNC: <0.2 U

## 2017-02-22 LAB
ANCA AB TITR SER IF: NORMAL TITER
IGG1 SER-MCNC: 650 MG/DL
IGG2 SER-MCNC: 416 MG/DL
IGG3 SER-MCNC: 71 MG/DL
IGG4 SER-MCNC: 9 MG/DL
P-ANCA TITR SER IF: NORMAL TITER

## 2017-02-23 LAB — MYELOPEROXIDASE AB SER-ACNC: 4 UNITS

## 2017-02-25 LAB
B2 GLYCOPROT1 IGA SER QL: <9 SAU
B2 GLYCOPROT1 IGG SER QL: <9 SGU
B2 GLYCOPROT1 IGM SER QL: <9 SMU

## 2017-03-03 ENCOUNTER — PATIENT MESSAGE (OUTPATIENT)
Dept: RHEUMATOLOGY | Facility: CLINIC | Age: 37
End: 2017-03-03

## 2017-03-08 ENCOUNTER — OFFICE VISIT (OUTPATIENT)
Dept: INTERNAL MEDICINE | Facility: CLINIC | Age: 37
End: 2017-03-08
Payer: COMMERCIAL

## 2017-03-08 VITALS
DIASTOLIC BLOOD PRESSURE: 90 MMHG | HEART RATE: 112 BPM | HEIGHT: 77 IN | OXYGEN SATURATION: 97 % | BODY MASS INDEX: 29.67 KG/M2 | WEIGHT: 251.31 LBS | SYSTOLIC BLOOD PRESSURE: 132 MMHG | TEMPERATURE: 99 F

## 2017-03-08 DIAGNOSIS — J02.9 PHARYNGITIS, UNSPECIFIED ETIOLOGY: Primary | ICD-10-CM

## 2017-03-08 DIAGNOSIS — J06.9 UPPER RESPIRATORY TRACT INFECTION, UNSPECIFIED TYPE: ICD-10-CM

## 2017-03-08 LAB — DEPRECATED S PYO AG THROAT QL EIA: NEGATIVE

## 2017-03-08 PROCEDURE — 1160F RVW MEDS BY RX/DR IN RCRD: CPT | Mod: S$GLB,,, | Performed by: INTERNAL MEDICINE

## 2017-03-08 PROCEDURE — 87880 STREP A ASSAY W/OPTIC: CPT | Mod: PO

## 2017-03-08 PROCEDURE — 99999 PR PBB SHADOW E&M-EST. PATIENT-LVL IV: CPT | Mod: PBBFAC,,, | Performed by: INTERNAL MEDICINE

## 2017-03-08 PROCEDURE — 87081 CULTURE SCREEN ONLY: CPT

## 2017-03-08 PROCEDURE — 99213 OFFICE O/P EST LOW 20 MIN: CPT | Mod: S$GLB,,, | Performed by: INTERNAL MEDICINE

## 2017-03-08 NOTE — MR AVS SNAPSHOT
[facility] - Internal Medicine  [address] [facility] [address] [place] [address]  Phone: [phone]  Fax: [phone]                  Miko Morton   3/8/2017 8:40 AM   Office Visit    Description:  Male : 1980   Provider:  Neil Moeller MD   Department:  [facility] - Internal Medicine           Reason for Visit     Sore Throat     Generalized Body Aches           Diagnoses this Visit        Comments    Pharyngitis, unspecified etiology    -  Primary     Upper respiratory tract infection, unspecified type                To Do List           Goals (5 Years of Data)     None      [facility]     [facility] On [facility] Nurse Care Line -  Assistance  Registered nurses in the [facility] On Call Center provide clinical advisement, health education, appointment booking, and other advisory services.  Call for this free service at [phone].             Medications           Message regarding Medications     Verify the changes and/or additions to your medication regime listed below are the same as discussed with your clinician today.  If any of these changes or additions are incorrect, please notify your healthcare provider.        STOP taking these medications     escitalopram oxalate (LEXAPRO) 10 MG tablet Take 1 tablet (10 mg total) by mouth once daily.           Verify that the below list of medications is an accurate representation of the medications you are currently taking.  If none reported, the list may be blank. If incorrect, please contact your healthcare provider. Carry this list with you in case of emergency.           Current Medications     hydrOXYzine HCl (ATARAX) 25 MG tablet 1-2 tabs every 8 hours prn anxiety    meloxicam (MOBIC) 15 MG tablet Take 1 tablet (15 mg total) by mouth once daily.    vardenafil (LEVITRA) 20 MG tablet Take 1 tablet (20 mg total) by mouth as needed for Erectile Dysfunction.           Clinical Reference Information           Your Vitals Were     BP Pulse Temp Height  "Weight SpO2    132/90 (BP Location: Right arm, Patient Position: Sitting) 112 99.3 °F (37.4 °C) (Tympanic) 6' 5" (1.956 m) 114 kg (251 lb 5.2 oz) 97%    BMI                29.8 kg/m2          Blood Pressure          Most Recent Value    BP  (!)  132/90      Allergies as of 3/8/2017     Sulfa (Sulfonamide Antibiotics)      Immunizations Administered on Date of Encounter - 3/8/2017     None      Orders Placed During Today's Visit      Normal Orders This Visit    Strep A culture, throat     THROAT SCREEN, RAPID       Instructions    flonase nasal spray 2 spays each nostril for nasal congestion, post nasal drip, runny nose    Delsym as needed for cough    Sudafed for nasal congestion    Allegra or zyrtec for allergies, post nasal drip, runny nose, watery eyes.    cepacol throat lozenges for sore throat    mucinex for chest congestion.     Tylenol or ibuprofen for pain or fever.     If use afrin then stop afrin after 3 days.         Language Assistance Services     ATTENTION: Language assistance services are available, free of charge. Please call [phone].      ATENCIÓN: Si habla español, tiene a tavarez disposición servicios gratuitos de asistencia lingüística. Llame al [phone].     LANDON Ý: N?u b?n nói Ti?ng Vi?t, có các d?ch v? h? tr? ngôn ng? mi?n phí dành cho b?n. G?i s? [phone].         [facility] - Internal Medicine complies with applicable Federal civil rights laws and does not discriminate on the basis of race, color, national origin, age, disability, or sex.        "

## 2017-03-08 NOTE — PROGRESS NOTES
"Subjective:      Patient ID: Miko Morton is a 36 y.o. male.    Chief Complaint: Sore Throat and Generalized Body Aches    Sore Throat    This is a new problem. The current episode started yesterday. The problem has been rapidly worsening. Neither side of throat is experiencing more pain than the other. There has been no fever. The pain is moderate. Associated symptoms include congestion (nasal, resolved with afrin) and headaches. Pertinent negatives include no coughing, diarrhea, ear discharge, ear pain, hoarse voice, shortness of breath or vomiting. Associated symptoms comments: Body aches, sneezing, runny nose, post nasal drip. He has had no exposure to strep or mono. He has tried NSAIDs (afrin, mucinex) for the symptoms. The treatment provided moderate relief.     Review of Systems   HENT: Positive for congestion (nasal, resolved with afrin) and sore throat. Negative for ear discharge, ear pain and hoarse voice.    Respiratory: Negative for cough and shortness of breath.    Gastrointestinal: Negative for diarrhea and vomiting.   Neurological: Positive for headaches.     Objective:   BP (!) 132/90 (BP Location: Right arm, Patient Position: Sitting)  Pulse (!) 112  Temp 99.3 °F (37.4 °C) (Tympanic)   Ht 6' 5" (1.956 m)  Wt 114 kg (251 lb 5.2 oz)  SpO2 97%  BMI 29.8 kg/m2    Physical Exam   Constitutional: He is oriented to person, place, and time. He appears well-developed and well-nourished. No distress.   HENT:   Head: Normocephalic and atraumatic.   Right Ear: Tympanic membrane normal.   Left Ear: Tympanic membrane normal.   Nose: Mucosal edema and rhinorrhea present. Right sinus exhibits no maxillary sinus tenderness and no frontal sinus tenderness. Left sinus exhibits no maxillary sinus tenderness and no frontal sinus tenderness.   Mouth/Throat: Posterior oropharyngeal erythema present. No oropharyngeal exudate or posterior oropharyngeal edema.   Eyes: Conjunctivae and EOM are normal. "   Cardiovascular: Normal rate.    tachy   Pulmonary/Chest: Effort normal and breath sounds normal. No respiratory distress. He has no wheezes. He has no rales.   Musculoskeletal: He exhibits no edema.   Lymphadenopathy:     He has no cervical adenopathy.   Neurological: He is alert and oriented to person, place, and time.   Skin: Skin is warm and dry.   Psychiatric: He has a normal mood and affect. His behavior is normal.       Assessment:     1. Pharyngitis, unspecified etiology    2. Upper respiratory tract infection, unspecified type      Plan:   Pharyngitis, unspecified etiology  -     THROAT SCREEN, RAPID    Upper respiratory tract infection, unspecified type  Over-the-counter medications as listed in patient instructions and reviewed with patient    Other orders  -     Strep A culture, throat      Return to clinic if quickly developed cough and fever, or significant worsening of condition.  Lab Frequency Next Occurrence   5 HIAA, quantitative, Urine Once 12/15/2016   C3 complement     C4 complement     Urinalysis Microscopic     C-reactive protein           No Follow-up on file.

## 2017-03-10 LAB — BACTERIA THROAT CULT: NORMAL

## 2017-03-31 ENCOUNTER — PATIENT MESSAGE (OUTPATIENT)
Dept: RHEUMATOLOGY | Facility: CLINIC | Age: 37
End: 2017-03-31

## 2017-04-10 ENCOUNTER — PATIENT MESSAGE (OUTPATIENT)
Dept: INTERNAL MEDICINE | Facility: CLINIC | Age: 37
End: 2017-04-10

## 2017-04-10 ENCOUNTER — PATIENT MESSAGE (OUTPATIENT)
Dept: RHEUMATOLOGY | Facility: CLINIC | Age: 37
End: 2017-04-10

## 2017-04-17 NOTE — TELEPHONE ENCOUNTER
Please help this patient make an appt with provider of his choice. I think he wants to see ENT which is ok. However if he is having shortness of breath that is not related to breathing through his nose then he should also see pulmonology.

## 2017-05-12 ENCOUNTER — LAB VISIT (OUTPATIENT)
Dept: LAB | Facility: HOSPITAL | Age: 37
End: 2017-05-12
Attending: OTOLARYNGOLOGY
Payer: COMMERCIAL

## 2017-05-12 ENCOUNTER — OFFICE VISIT (OUTPATIENT)
Dept: OTOLARYNGOLOGY | Facility: CLINIC | Age: 37
End: 2017-05-12
Payer: COMMERCIAL

## 2017-05-12 VITALS
HEIGHT: 77 IN | TEMPERATURE: 97 F | HEART RATE: 88 BPM | BODY MASS INDEX: 30.3 KG/M2 | WEIGHT: 256.63 LBS | RESPIRATION RATE: 18 BRPM | SYSTOLIC BLOOD PRESSURE: 158 MMHG | DIASTOLIC BLOOD PRESSURE: 99 MMHG

## 2017-05-12 DIAGNOSIS — J30.89 NON-SEASONAL ALLERGIC RHINITIS DUE TO FUNGAL SPORES: Primary | ICD-10-CM

## 2017-05-12 DIAGNOSIS — J30.89 NON-SEASONAL ALLERGIC RHINITIS DUE TO FUNGAL SPORES: ICD-10-CM

## 2017-05-12 DIAGNOSIS — R42 DIZZINESS: ICD-10-CM

## 2017-05-12 DIAGNOSIS — R42 VERTIGO: ICD-10-CM

## 2017-05-12 PROCEDURE — 99214 OFFICE O/P EST MOD 30 MIN: CPT | Mod: S$GLB,,, | Performed by: OTOLARYNGOLOGY

## 2017-05-12 PROCEDURE — 86003 ALLG SPEC IGE CRUDE XTRC EA: CPT

## 2017-05-12 PROCEDURE — 86003 ALLG SPEC IGE CRUDE XTRC EA: CPT | Mod: 59

## 2017-05-12 PROCEDURE — 36415 COLL VENOUS BLD VENIPUNCTURE: CPT | Mod: PO

## 2017-05-12 PROCEDURE — 99999 PR PBB SHADOW E&M-EST. PATIENT-LVL III: CPT | Mod: PBBFAC,,, | Performed by: OTOLARYNGOLOGY

## 2017-05-12 PROCEDURE — 1160F RVW MEDS BY RX/DR IN RCRD: CPT | Mod: S$GLB,,, | Performed by: OTOLARYNGOLOGY

## 2017-05-12 NOTE — MR AVS SNAPSHOT
[facility] - ENT  [address] [facility] [place] [address]  Phone: [phone]  Fax: [phone]                  Miko Morton   2017 11:00 AM   Office Visit    Description:  Male : 1980   Provider:  Jey Goddard MD   Department:  [facility] - ENT           Reason for Visit     Sinus Problem     Dizziness           Diagnoses this Visit        Comments    Non-seasonal allergic rhinitis due to fungal spores    -  Primary            To Do List           Future Appointments        Provider Department Dept Phone    2017 12:15 PM LAB, SAME DAY [facility] [phone]      Goals (5 Years of Data)     None      [facility]     [facility] Nurse Care Line -  Assistance  Unless otherwise directed by your provider, please contact [facility], our nurse care line that is available for  assistance.     Registered nurses in the [facility] provide: appointment scheduling, clinical advisement, health education, and other advisory services.  Call: [phone] (toll free)               Medications           Message regarding Medications     Verify the changes and/or additions to your medication regime listed below are the same as discussed with your clinician today.  If any of these changes or additions are incorrect, please notify your healthcare provider.             Verify that the below list of medications is an accurate representation of the medications you are currently taking.  If none reported, the list may be blank. If incorrect, please contact your healthcare provider. Carry this list with you in case of emergency.           Current Medications     hydrOXYzine HCl (ATARAX) 25 MG tablet 1-2 tabs every 8 hours prn anxiety    meloxicam (MOBIC) 15 MG tablet Take 1 tablet (15 mg total) by mouth once daily.    vardenafil (LEVITRA) 20 MG tablet Take 1 tablet (20 mg total) by mouth as needed for Erectile Dysfunction.           Clinical Reference  "Information           Your Vitals Were     BP Pulse Temp Resp Height Weight    158/99 88 97.4 °F (36.3 °C) (Tympanic) 18 6' 5" (1.956 m) 116.4 kg (256 lb 9.9 oz)    BMI                30.43 kg/m2          Blood Pressure          Most Recent Value    BP  (!)  158/99      Allergies as of 5/12/2017     Sulfa (Sulfonamide Antibiotics)      Immunizations Administered on Date of Encounter - 5/12/2017     None      Orders Placed During Today's Visit     Future Labs/Procedures Expected by Expires    Allergen, Cocklebur  5/12/2017 7/11/2018    Allergen, Elm Casey  5/12/2017 7/11/2018    Allergen, Meadow Grass (EarlyDocy Blue)  5/12/2017 7/11/2018    Allergen, Mucor Racemosus  5/12/2017 7/11/2018    Allergen, [place] IgE  5/12/2017 7/11/2018    Allergen, White Omar  5/12/2017 7/11/2018    Allergen-Alternaria Alternata  5/12/2017 7/11/2018    Allergen-Casey  5/12/2017 7/11/2018    Allergen-Common Pigweed  5/12/2017 7/11/2018    Allergen-Silver Birch  5/12/2017 7/11/2018    Aspergillus fumagatus IgE  5/12/2017 7/11/2018    Bermuda grass IgE  5/12/2017 7/11/2018    Cat epithelium IgE  5/12/2017 7/11/2018    Cladosporium IgE  5/12/2017 7/11/2018    Cockroach, American IgE  5/12/2017 7/11/2018    [place], bald IgE  5/12/2017 7/11/2018    D. farinae IgE  5/12/2017 7/11/2018    D. pteronyssinus IgE  5/12/2017 7/11/2018    Dog dander IgE  5/12/2017 7/11/2018    Feather Panel #2  5/12/2017 7/11/2018    Ayush grass IgE  5/12/2017 7/11/2018    Ferrara elder, rough IgE  5/12/2017 7/11/2018    Mugwort IgE  5/12/2017 7/11/2018    Nettle IgE  5/12/2017 7/11/2018    [place], white IgE  5/12/2017 7/11/2018    Penicillium IgE  5/12/2017 7/11/2018    Plantain, English IgE  5/12/2017 7/11/2018    Ragweed, short, common IgE  5/12/2017 7/11/2018    RAST Allergen for [place]  5/12/2017 7/11/2018    RAST Allergen Maple (Sweet Grass)  5/12/2017 7/11/2018    RAST Allergen [place]  5/12/2017 7/11/2018    RAST Allergen, Lamb's Quarters  " 5/12/2017 7/11/2018    RAST Allergen, Sheep Talmage(Yellow Dock)  5/12/2017 7/11/2018    Hussein IgE  5/12/2017 7/11/2018      Language Assistance Services     ATTENTION: Language assistance services are available, free of charge. Please call [phone].      ATENCIÓN: Si habla roseline, tiene a tavarez disposición servicios gratuitos de asistencia lingüística. Llame al [phone].     CHÚ Ý: N?u b?n nói Ti?ng Vi?t, có các d?ch v? h? tr? ngôn ng? mi?n phí dành cho b?n. G?i s? [phone].         [facility] complies with applicable Federal civil rights laws and does not discriminate on the basis of race, color, national origin, age, disability, or sex.

## 2017-05-12 NOTE — PROGRESS NOTES
Subjective:   Patient: Miko Morton [id number], :1980   Visit date:2017 11:10 AM    Chief Complaint:  Sinus Problem (on and off x 9 months) and Dizziness    HPI:  Miko is a 36 y.o. male is here for evaluation of the following issue(s):    .  High stress job.  Odd shooting pains starting in August.  Initially diagnosed with MS.  Spinal tap was negative.  Recommended by neuro to have follow up MRI in 6 months.  About 1 month ago, he began noting a musty smell in his appt.  He looked up on the internet regarding symptoms of mold exposure and symptoms were consistent with this. He does feel better when out of town where his girlfriend lives in [place].   His dizziness is reduced but not resolved.  He has visual changes with a halo and headache.  This is much better when out of town.  He has intermittent drainage from the nose. Diffuse joint pain.  He has numbness and tingling in the arms and legs.  He has been to the ER twice with this.  Generalized fatigue and weakness.   Headaches are somewhat better when out of the apartment.  Word finding difficulty and memory impairment.  Frequent urination.  Mild nasal congestion.   Dizziness is worse in the am.  It gets somewhat better throughout the day.  It can be a generalized sense of imbalance but sometimes with a spinning sensation that is mild.  He had a VNG several months ago which was normal.    I personally reviewed the MRI from 2016.  There is no evidence of opacification or other changes in the sinus cavities.  There are multiple scattered lesions on the brain themselves which are detailed by the radiologist.        Review of Systems:  -     Allergic/Immunologic: is allergic to sulfa (sulfonamide antibiotics)..  -     Constitutional: Current temp: 97.4 °F (36.3 °C) (Tympanic)      His meds, allergies, medical, surgical, social & family histories were reviewed & updated:  -     He has a current medication list which includes the  "following prescription(s): hydroxyzine hcl, meloxicam, and vardenafil.  -     He  has no past medical history on file.   -     He  does not have any pertinent problems on file.   -     He  has no past surgical history on file.  -     He  reports that he quit smoking about 6 months ago. His smoking use included Cigarettes. He has never used smokeless tobacco. He reports that he drinks alcohol. He reports that he does not use illicit drugs.  -     His family history includes Cancer in his father; Diabetes in his father.  -     He is allergic to sulfa (sulfonamide antibiotics).    Objective:     Physical Exam:  Vitals:  BP (!) 158/99  Pulse 88  Temp 97.4 °F (36.3 °C) (Tympanic)   Resp 18  Ht 6' 5" (1.956 m)  Wt 116.4 kg (256 lb 9.9 oz)  BMI 30.43 kg/m2  Communication:  Able to communicate, no hoarseness.  Head & Face:  Normocephalic, atraumatic, no sinus tenderness.  Eyes:  Extraocular motions intact.  Ears:  Otoscopy of external auditory canals and tympanic membranes was normal, clinical speech reception thresholds grossly intact, no mass/lesion of auricle.  Nose:  No masses/lesions of external nose, nasal mucosa, septum, and turbinates were within normal limits.  Mouth:  No mass/lesion of lips, teeth, gums, hard/soft palate, tongue, tonsils, or oropharynx.  Neck & Lymphatics:  No cervical lymphadenopathy, no neck mass/crepitus/ asymmetry, trachea is midline, no thyroid enlargement/tenderness/mass.  Neuro/Psych: Alert with normal mood and affect.   Respiration/Chest:  Symmetric expansion during respiration, normal respiratory effort.  Skin:  Warm and intact.    Assessment & Plan:         I discussed with him that this does not appear to be vestibular in nature and overall I really do not think there is a significant concern for fungal infection.  I did discuss with him that there may be a small component of this that is ALLERGIC in nature.  He is very frustrated at this point as it has significantly impacted his " personal life as well as his work.  Discussed with him that I think that it is reasonable to perform antigen specific ALLERGY testing but even he has some significant ALLERGY, I do not think that this explains his symptoms.  We could treat him for ALLERGY and observe for any improvement that he has but overall I have emphasized that his issues seem most consistent with central nervous system dysfunction and would recommend continued management by neurology.  I will send him the results of his ALLERGY testing when they're available.

## 2017-05-15 LAB
A ALTERNATA IGE QN: <0.35 KU/L
A FUMIGATUS IGE QN: <0.35 KU/L
ALLERGEN BOXELDER MAPLE TREE IGE: <0.35 KU/L
ALLERGEN MAPLE (BOX ELDER) CLASS: NORMAL
ALLERGEN MULBERRY CLASS: NORMAL
ALLERGEN MULBERRY TREE IGE: <0.35 KU/L
ALLERGEN PENICILLIUM IGE: <0.35 KU/L
ALLERGEN WHITE ASH TREE IGE: <0.35 KU/L
BALD CYPRESS IGE QN: <0.35 KU/L
BERMUDA GRASS IGE QN: 2.41 KU/L
C HERBARUM IGE QN: <0.35 KU/L
CAT DANDER IGE QN: <0.35 KU/L
CEDAR IGE QN: <0.35 KU/L
COCKLEBUR IGE QN: <0.35 KU/L
COMMON RAGWEED IGE QN: <0.35 KU/L
D FARINAE IGE QN: <0.35 KU/L
D PTERONYSS IGE QN: <0.35 KU/L
DEPRECATED A ALTERNATA IGE RAST QL: NORMAL
DEPRECATED A FUMIGATUS IGE RAST QL: NORMAL
DEPRECATED BALD CYPRESS IGE RAST QL: NORMAL
DEPRECATED BERMUDA GRASS IGE RAST QL: ABNORMAL
DEPRECATED C HERBARUM IGE RAST QL: NORMAL
DEPRECATED CAT DANDER IGE RAST QL: NORMAL
DEPRECATED CEDAR IGE RAST QL: NORMAL
DEPRECATED COCKLEBUR IGE RAST QL: NORMAL
DEPRECATED COMMON RAGWEED IGE RAST QL: NORMAL
DEPRECATED D FARINAE IGE RAST QL: NORMAL
DEPRECATED D PTERONYSS IGE RAST QL: NORMAL
DEPRECATED DOG DANDER IGE RAST QL: NORMAL
DEPRECATED ENGL PLANTAIN IGE RAST QL: NORMAL
DEPRECATED GOOSEFOOT IGE RAST QL: NORMAL
DEPRECATED JOHNSON GRASS IGE RAST QL: ABNORMAL
DEPRECATED KENT BLUE GRASS IGE RAST QL: ABNORMAL
DEPRECATED M RACEMOSUS IGE RAST QL: NORMAL
DEPRECATED MARSH ELDER IGE RAST QL: NORMAL
DEPRECATED MUGWORT IGE RAST QL: NORMAL
DEPRECATED NETTLE IGE RAST QL: NORMAL
DEPRECATED PECAN/HICK TREE IGE RAST QL: NORMAL
DEPRECATED ROACH IGE RAST QL: NORMAL
DEPRECATED SHEEP SORREL IGE RAST QL: NORMAL
DEPRECATED SILVER BIRCH IGE RAST QL: NORMAL
DEPRECATED TIMOTHY IGE RAST QL: ABNORMAL
DEPRECATED WHITE OAK IGE RAST QL: NORMAL
DOG DANDER IGE QN: <0.35 KU/L
ELM CEDAR CLASS: NORMAL
ELM CEDAR, IGE: <0.35 KU/L
ENGL PLANTAIN IGE QN: <0.35 KU/L
GOOSEFOOT IGE QN: <0.35 KU/L
JOHNSON GRASS IGE QN: 2.91 KU/L
KENT BLUE GRASS IGE QN: 7.4 KU/L
M RACEMOSUS IGE QN: <0.35 KU/L
MARSH ELDER IGE QN: <0.35 KU/L
MUGWORT IGE QN: <0.35 KU/L
NETTLE IGE QN: <0.35 KU/L
PECAN/HICK TREE IGE QN: <0.35 KU/L
PENICILLIUM CLASS: NORMAL
ROACH IGE QN: <0.35 KU/L
SHEEP SORREL IGE QN: <0.35 KU/L
SILVER BIRCH IGE QN: <0.35 KU/L
TIMOTHY IGE QN: 4.94 KU/L
WHITE ASH CLASS: NORMAL
WHITE OAK IGE QN: <0.35 KU/L

## 2017-05-16 ENCOUNTER — PATIENT MESSAGE (OUTPATIENT)
Dept: OTOLARYNGOLOGY | Facility: CLINIC | Age: 37
End: 2017-05-16

## 2017-05-16 LAB
AMER SYCAMORE IGE QN: <0.35 KU/L
CMN PIGWEED IGE QN: <0.35 KU/L
DEPRECATED COMMON PIGWEED IGE RAST QL: NORMAL
FEATHER PANEL #2: <0.35 KU/L

## 2017-09-07 ENCOUNTER — TELEPHONE (OUTPATIENT)
Dept: INTERNAL MEDICINE | Facility: CLINIC | Age: 37
End: 2017-09-07

## 2017-09-07 NOTE — TELEPHONE ENCOUNTER
Pt stated he feels like there is pressure in his brain, has dizziness, body feels weak, hands are losing function, and arms are weak.  Stated his most recent BP was 134/93 and P 61.  Stated he took otc Aleve a few hours ago to try to relieve pain and pressure in head.  Stated he does not want to go to the ER because he has been to [facility] ER and [facility] ER with similar symptoms and only had an EKG done and was sent home.  Stated he has a neurology appt scheduled at [facility] on 9/13/17 with Dr. John.  Stated Dr. Moeller is the only doctor who has helped him.

## 2017-09-07 NOTE — TELEPHONE ENCOUNTER
Conveyed all recommendations to pt who verbalized understanding and scheduled appt with Dr. Moeller for tomorrow at 1:20 pm.

## 2017-09-07 NOTE — TELEPHONE ENCOUNTER
He should go to ER for severe or significantly worsening symptoms, pavel fever, confusion, somnolence, cp, dyspnea, falls, neck rigidity, severe fatigue.  I can see pt in clinic next available if he would like. Otherwise be sure to keep neuro appt.

## 2017-09-07 NOTE — TELEPHONE ENCOUNTER
----- Message from Annalisa Peacock sent at 9/7/2017  4:34 PM CDT -----  Contact: [phone]  Pt would like to consult with nurse. Feels his brain is swelling. Having problems with limb function. Please call back at [phone]. x. katiana

## 2017-09-08 ENCOUNTER — OFFICE VISIT (OUTPATIENT)
Dept: INTERNAL MEDICINE | Facility: CLINIC | Age: 37
End: 2017-09-08
Payer: COMMERCIAL

## 2017-09-08 ENCOUNTER — LAB VISIT (OUTPATIENT)
Dept: LAB | Facility: HOSPITAL | Age: 37
End: 2017-09-08
Attending: INTERNAL MEDICINE
Payer: COMMERCIAL

## 2017-09-08 VITALS
OXYGEN SATURATION: 97 % | DIASTOLIC BLOOD PRESSURE: 100 MMHG | BODY MASS INDEX: 29.6 KG/M2 | SYSTOLIC BLOOD PRESSURE: 136 MMHG | WEIGHT: 250.69 LBS | TEMPERATURE: 98 F | HEIGHT: 77 IN | HEART RATE: 76 BPM

## 2017-09-08 DIAGNOSIS — R03.0 ELEVATED BP WITHOUT DIAGNOSIS OF HYPERTENSION: ICD-10-CM

## 2017-09-08 DIAGNOSIS — R45.89 DEPRESSED MOOD: ICD-10-CM

## 2017-09-08 DIAGNOSIS — R20.0 NUMBNESS: ICD-10-CM

## 2017-09-08 DIAGNOSIS — F41.9 ANXIETY: ICD-10-CM

## 2017-09-08 DIAGNOSIS — H53.8 BLURRED VISION: ICD-10-CM

## 2017-09-08 DIAGNOSIS — R42 DIZZINESS: Primary | ICD-10-CM

## 2017-09-08 LAB
ALBUMIN SERPL BCP-MCNC: 4 G/DL
ALP SERPL-CCNC: 52 U/L
ALT SERPL W/O P-5'-P-CCNC: 31 U/L
ANION GAP SERPL CALC-SCNC: 11 MMOL/L
AST SERPL-CCNC: 21 U/L
BASOPHILS # BLD AUTO: 0.02 K/UL
BASOPHILS NFR BLD: 0.3 %
BILIRUB SERPL-MCNC: 0.6 MG/DL
BUN SERPL-MCNC: 15 MG/DL
CALCIUM SERPL-MCNC: 9.4 MG/DL
CHLORIDE SERPL-SCNC: 105 MMOL/L
CO2 SERPL-SCNC: 23 MMOL/L
CREAT SERPL-MCNC: 1 MG/DL
CRP SERPL-MCNC: 0.9 MG/L
DIFFERENTIAL METHOD: NORMAL
EOSINOPHIL # BLD AUTO: 0.1 K/UL
EOSINOPHIL NFR BLD: 1.7 %
ERYTHROCYTE [DISTWIDTH] IN BLOOD BY AUTOMATED COUNT: 12.1 %
ERYTHROCYTE [SEDIMENTATION RATE] IN BLOOD BY WESTERGREN METHOD: 2 MM/HR
EST. GFR  (AFRICAN AMERICAN): >60 ML/MIN/1.73 M^2
EST. GFR  (NON AFRICAN AMERICAN): >60 ML/MIN/1.73 M^2
FOLATE SERPL-MCNC: 13.9 NG/ML
GLUCOSE SERPL-MCNC: 73 MG/DL
HCT VFR BLD AUTO: 44.2 %
HGB BLD-MCNC: 15.7 G/DL
LYMPHOCYTES # BLD AUTO: 1.9 K/UL
LYMPHOCYTES NFR BLD: 31.8 %
MCH RBC QN AUTO: 30.3 PG
MCHC RBC AUTO-ENTMCNC: 35.5 G/DL
MCV RBC AUTO: 85 FL
MONOCYTES # BLD AUTO: 0.5 K/UL
MONOCYTES NFR BLD: 8.5 %
NEUTROPHILS # BLD AUTO: 3.4 K/UL
NEUTROPHILS NFR BLD: 57.5 %
PLATELET # BLD AUTO: 237 K/UL
PMV BLD AUTO: 11.1 FL
POTASSIUM SERPL-SCNC: 4 MMOL/L
PROT SERPL-MCNC: 7.6 G/DL
RBC # BLD AUTO: 5.19 M/UL
SODIUM SERPL-SCNC: 139 MMOL/L
VIT B12 SERPL-MCNC: 596 PG/ML
WBC # BLD AUTO: 5.88 K/UL

## 2017-09-08 PROCEDURE — 84425 ASSAY OF VITAMIN B-1: CPT

## 2017-09-08 PROCEDURE — 99999 PR PBB SHADOW E&M-EST. PATIENT-LVL III: CPT | Mod: PBBFAC,,, | Performed by: INTERNAL MEDICINE

## 2017-09-08 PROCEDURE — 82746 ASSAY OF FOLIC ACID SERUM: CPT

## 2017-09-08 PROCEDURE — 99214 OFFICE O/P EST MOD 30 MIN: CPT | Mod: S$GLB,,, | Performed by: INTERNAL MEDICINE

## 2017-09-08 PROCEDURE — 80053 COMPREHEN METABOLIC PANEL: CPT

## 2017-09-08 PROCEDURE — 85651 RBC SED RATE NONAUTOMATED: CPT | Mod: PO

## 2017-09-08 PROCEDURE — 85025 COMPLETE CBC W/AUTO DIFF WBC: CPT

## 2017-09-08 PROCEDURE — 82607 VITAMIN B-12: CPT

## 2017-09-08 PROCEDURE — 3008F BODY MASS INDEX DOCD: CPT | Mod: S$GLB,,, | Performed by: INTERNAL MEDICINE

## 2017-09-08 PROCEDURE — 86140 C-REACTIVE PROTEIN: CPT

## 2017-09-08 PROCEDURE — 86703 HIV-1/HIV-2 1 RESULT ANTBDY: CPT

## 2017-09-08 PROCEDURE — 86038 ANTINUCLEAR ANTIBODIES: CPT

## 2017-09-08 PROCEDURE — 36415 COLL VENOUS BLD VENIPUNCTURE: CPT | Mod: PO

## 2017-09-08 RX ORDER — MECLIZINE HYDROCHLORIDE 25 MG/1
25 TABLET ORAL 3 TIMES DAILY PRN
Qty: 30 TABLET | Refills: 0 | Status: SHIPPED | OUTPATIENT
Start: 2017-09-08 | End: 2017-09-18

## 2017-09-08 RX ORDER — GABAPENTIN 300 MG/1
300 CAPSULE ORAL NIGHTLY
Qty: 90 CAPSULE | Refills: 0 | Status: SHIPPED | OUTPATIENT
Start: 2017-09-08 | End: 2018-09-08

## 2017-09-08 RX ORDER — ESCITALOPRAM OXALATE 10 MG/1
10 TABLET ORAL DAILY
Qty: 90 TABLET | Refills: 0 | Status: SHIPPED | OUTPATIENT
Start: 2017-09-08 | End: 2017-10-08

## 2017-09-08 RX ORDER — HYDROCHLOROTHIAZIDE 12.5 MG/1
12.5 CAPSULE ORAL DAILY
Qty: 90 CAPSULE | Refills: 0 | Status: SHIPPED | OUTPATIENT
Start: 2017-09-08 | End: 2017-10-08

## 2017-09-08 NOTE — PROGRESS NOTES
"Subjective:      Patient ID: Miko Morton is a 36 y.o. male.    Chief Complaint: No chief complaint on file.    37 yo with History reviewed. No pertinent past medical history.    Patient Active Problem List:     Anxiety    Here today c/o increasing headaches, dizziness, numbness/pain to anastasiia arms over past one week. (biceps and distal).  Thinks symptoms may have been triggered by increase in exercise last week.  Continues with some anastasiia aching feet. He saw chiropractor for sciatica with some help.  Headaches are better today. Continues with dizziness and numbness.       Review of Systems   Constitutional: Negative for chills and fever.   HENT: Negative for ear pain and sore throat.    Eyes: Positive for visual disturbance. Negative for pain and redness.   Respiratory: Negative for cough and wheezing.    Cardiovascular: Negative for chest pain and palpitations.   Gastrointestinal: Negative for abdominal pain and blood in stool.   Genitourinary: Negative for dysuria and hematuria.   Neurological: Positive for dizziness, numbness and headaches. Negative for tremors, seizures, syncope, facial asymmetry and speech difficulty.   Psychiatric/Behavioral: Positive for dysphoric mood. Negative for hallucinations and suicidal ideas. The patient is nervous/anxious.      Objective:   BP (!) 136/100 (BP Location: Right arm, Patient Position: Sitting)   Pulse 76   Temp 97.6 °F (36.4 °C) (Tympanic)   Ht 6' 5" (1.956 m)   Wt 113.7 kg (250 lb 10.6 oz)   SpO2 97%   BMI 29.72 kg/m²     Physical Exam   Constitutional: He is oriented to person, place, and time. He appears well-developed and well-nourished. No distress.   HENT:   Head: Normocephalic and atraumatic.   Mouth/Throat: Oropharynx is clear and moist.   Eyes: EOM are normal. Pupils are equal, round, and reactive to light.   Neck: Neck supple. No spinous process tenderness and no muscular tenderness present. No neck rigidity. No edema and normal range of motion " present. No thyromegaly present.   Cardiovascular: Normal rate and regular rhythm.    Pulmonary/Chest: Breath sounds normal. He has no wheezes. He has no rales.   Abdominal: Soft. Bowel sounds are normal. There is no tenderness.   Musculoskeletal: He exhibits no edema or tenderness.   Lymphadenopathy:     He has no cervical adenopathy.   Neurological: He is alert and oriented to person, place, and time. He displays normal reflexes. No cranial nerve deficit or sensory deficit. Coordination normal.   Skin: Skin is warm and dry.   Psychiatric: He has a normal mood and affect. His behavior is normal.       Assessment:     1. Dizziness    2. Numbness    3. Blurred vision    4. Anxiety    5. Depressed mood    6. Elevated BP without diagnosis of hypertension      Plan:   Dizziness  -     meclizine (ANTIVERT) 25 mg tablet; Take 1 tablet (25 mg total) by mouth 3 (three) times daily as needed for Dizziness.  Dispense: 30 tablet; Refill: 0    Numbness  -     gabapentin (NEURONTIN) 300 MG capsule; Take 1 capsule (300 mg total) by mouth every evening.  Dispense: 90 capsule; Refill: 0  -     CBC auto differential; Future; Expected date: 09/08/2017  -     Comprehensive metabolic panel; Future; Expected date: 09/08/2017  -     Vitamin B12; Future; Expected date: 09/08/2017  -     VITAMIN B1; Future; Expected date: 09/08/2017  -     Folate; Future; Expected date: 09/08/2017  -     HIV-1 and HIV-2 antibodies; Future; Expected date: 09/08/2017  -     GINGER; Future; Expected date: 09/08/2017  -     C-reactive protein; Future; Expected date: 09/08/2017  -     Sedimentation rate, manual; Future; Expected date: 09/08/2017    Blurred vision  -     Ambulatory referral to Optometry    Anxiety  -     escitalopram oxalate (LEXAPRO) 10 MG tablet; Take 1 tablet (10 mg total) by mouth once daily.  Dispense: 90 tablet; Refill: 0    Depressed mood    Elevated BP without diagnosis of hypertension  -     hydrochlorothiazide (MICROZIDE) 12.5 mg capsule;  Take 1 capsule (12.5 mg total) by mouth once daily.  Dispense: 90 capsule; Refill: 0        Lab Frequency Next Occurrence   5 HIAA, quantitative, Urine Once 12/15/2016   C3 complement     C4 complement     Urinalysis Microscopic     C-reactive protein         Problem List Items Addressed This Visit        Psychiatric    Anxiety    Relevant Medications    escitalopram oxalate (LEXAPRO) 10 MG tablet      Other Visit Diagnoses     Dizziness    -  Primary    Relevant Medications    meclizine (ANTIVERT) 25 mg tablet    Numbness        Relevant Medications    gabapentin (NEURONTIN) 300 MG capsule    Other Relevant Orders    CBC auto differential    Comprehensive metabolic panel    Vitamin B12    VITAMIN B1    Folate    HIV-1 and HIV-2 antibodies    GINGER    C-reactive protein    Sedimentation rate, manual (Completed)    Blurred vision        Relevant Orders    Ambulatory referral to Optometry    Depressed mood        Elevated BP without diagnosis of hypertension        Relevant Medications    hydrochlorothiazide (MICROZIDE) 12.5 mg capsule          Return in about 4 weeks (around 10/6/2017), or if symptoms worsen or fail to improve.

## 2017-09-11 LAB
ANA SER QL IF: NORMAL
HIV 1+2 AB+HIV1 P24 AG SERPL QL IA: NEGATIVE

## 2017-09-12 ENCOUNTER — TELEPHONE (OUTPATIENT)
Dept: INTERNAL MEDICINE | Facility: CLINIC | Age: 37
End: 2017-09-12

## 2017-09-12 DIAGNOSIS — R20.0 NUMBNESS: Primary | ICD-10-CM

## 2017-09-12 LAB — VIT B1 SERPL-MCNC: 55 UG/L (ref 38–122)

## 2017-09-12 RX ORDER — VALSARTAN 80 MG/1
80 TABLET ORAL DAILY
Qty: 90 TABLET | Refills: 3 | Status: SHIPPED | OUTPATIENT
Start: 2017-09-12 | End: 2018-09-12

## 2017-09-12 NOTE — TELEPHONE ENCOUNTER
Pt stated his pharmacist did not give him the HCTZ prescription stating it contains sulfa and he has an allergy to sulfa.  Requesting different med be prescribed.    Pt stated he would like a referral to see Dr. John at [facility].

## 2017-09-12 NOTE — TELEPHONE ENCOUNTER
Notified pt that neuro referral has been faxed to Dr. John's office and that valsartan prescription has been sent to pharmacy to replace HCTZ.  Pt verbalized understanding.

## 2017-09-12 NOTE — TELEPHONE ENCOUNTER
Interactions between sulfonamide antibiotics and non-antimicrobial sulfonamides are not proven.  However I did send in valsartan 80 mg to replace his hydrochlorothiazide.

## 2017-09-12 NOTE — TELEPHONE ENCOUNTER
----- Message from Ally Packer sent at 9/12/2017  8:56 AM CDT -----  Contact: pt   Pt request a call back from office ,,, refused to say why ,,,  Please call pt back at [phone]

## 2017-09-19 ENCOUNTER — LAB VISIT (OUTPATIENT)
Dept: LAB | Facility: HOSPITAL | Age: 37
End: 2017-09-19
Attending: PSYCHIATRY & NEUROLOGY
Payer: COMMERCIAL

## 2017-09-19 ENCOUNTER — TELEPHONE (OUTPATIENT)
Dept: INTERNAL MEDICINE | Facility: CLINIC | Age: 37
End: 2017-09-19

## 2017-09-19 DIAGNOSIS — H53.8 OTHER SPECIFIED VISUAL DISTURBANCES: Primary | ICD-10-CM

## 2017-09-19 PROCEDURE — 83519 RIA NONANTIBODY: CPT

## 2017-09-19 PROCEDURE — 36415 COLL VENOUS BLD VENIPUNCTURE: CPT | Mod: PO

## 2017-09-19 NOTE — TELEPHONE ENCOUNTER
Pt stated he has an order from a doctor at [facility] to have blood work drawn.  Needs to complete at [facility] because [facility] lab does not accept his insurance.  Advised pt that he can bring order to [facility] and have lab drawn at his convenience.  Pt verbalized understanding.

## 2017-09-19 NOTE — TELEPHONE ENCOUNTER
----- Message from Brittany Taveras sent at 9/19/2017 10:32 AM CDT -----  Contact: Pt  Pt has questions about blood test being done. .[phone] (qrhi)

## 2017-09-25 LAB — ACHR BIND AB SER-SCNC: 0 NMOL/L
